# Patient Record
Sex: FEMALE | Race: OTHER | HISPANIC OR LATINO | URBAN - METROPOLITAN AREA
[De-identification: names, ages, dates, MRNs, and addresses within clinical notes are randomized per-mention and may not be internally consistent; named-entity substitution may affect disease eponyms.]

---

## 2019-02-06 VITALS
SYSTOLIC BLOOD PRESSURE: 116 MMHG | OXYGEN SATURATION: 94 % | HEIGHT: 66 IN | TEMPERATURE: 98 F | RESPIRATION RATE: 16 BRPM | HEART RATE: 80 BPM | WEIGHT: 220.02 LBS | DIASTOLIC BLOOD PRESSURE: 47 MMHG

## 2019-02-06 RX ORDER — CHLORHEXIDINE GLUCONATE 213 G/1000ML
1 SOLUTION TOPICAL ONCE
Qty: 0 | Refills: 0 | Status: DISCONTINUED | OUTPATIENT
Start: 2019-02-07 | End: 2019-02-22

## 2019-02-06 NOTE — H&P ADULT - HISTORY OF PRESENT ILLNESS
***PT TO BRING IN ALL MEDS    83 y.o Persian Speaking  Female with PMHx of HTN, Hyperlipidemia, NIDDM, who presented to her cardiologist Dr. Medina c/o recent decline in her ET over the last few months.  Pt states she can barely walk a block before easily getting fatigued whereas previously she was able to walk several blocks beofre finding the need to stop and rest.  She denies any CP,  SOB, palpitations, dizziness, syncope, recent PND/orthopnea, or  LE edema.  Nuclear Stress test performed on 05/31/18, which revealed mild inferoapical ischemia, LVEF of 53%.  Echo 05/28/18 revealed concentric LVH, LAE, grade 1 diastolic dysfunction, mild MR/MI, trace MR/AI, LVEF of 55-60%.      In light of pt's risk factors, above CCS Anginal Class 3 Equivalent symptoms, and an abnormal Stress test, pt is now referred to [facility] for recommended Cardiac Cath with possible intervention if clinically indicated to  r/o suspected underlying CAD. 83 y.o French Speaking Female with PMHx of HTN, Hyperlipidemia, NIDDM, who presented to her cardiologist Dr. Medina c/o recent decline in her ET over the last few months. Pt states she can barely walk a block before easily getting fatigued whereas previously she was able to walk several blocks beofre finding the need to stop and rest.  She denies any CP,  SOB, palpitations, dizziness, syncope, recent PND/orthopnea, or  LE edema.  Nuclear Stress test performed on 05/31/18, which revealed mild inferoapical ischemia, LVEF of 53%.  Echo 05/28/18 revealed concentric LVH, LAE, grade 1 diastolic dysfunction, mild MR/NV, trace MR/AI, LVEF of 55-60%.      In light of pt's risk factors, above CCS Anginal Class 3 Equivalent symptoms, and an abnormal Stress test, pt is now referred to [facility] for recommended Cardiac Cath with possible intervention if clinically indicated to  r/o suspected underlying CAD. 83 y.o Yi Speaking Female with PMHx of HTN, Hyperlipidemia, NIDDM, who presented to her cardiologist Dr. Medina c/o recent decline in her ET over the last few months. Pt states she can barely walk a block before easily getting fatigued whereas previously she was able to walk several blocks beofre finding the need to stop and rest.  She denies any CP,  SOB, palpitations, dizziness, syncope, recent PND/orthopnea, or  LE edema.  Nuclear Stress test performed on 05/31/18, which revealed mild inferoapical ischemia, LVEF of 53%.  Echo 05/28/18 revealed concentric LVH, LAE, grade 1 diastolic dysfunction, mild MR/AK, trace MR/AI, LVEF of 55-60%.      In light of pt's risk factors, above CCS Anginal Class 3 Equivalent symptoms, and an abnormal Stress test, pt is now referred to [facility] for recommended Cardiac Cath with possible intervention if clinically indicated to r/o suspected underlying CAD. 83 y.o Turkish Speaking Female with PMHx of HTN, Hyperlipidemia, NIDDM, who presented to her cardiologist Dr. Medina c/o recent decline in her ET over the last few months. Pt states she can barely walk a block before easily getting fatigued whereas previously she was able to walk several blocks beofre finding the need to stop and rest.  She denies any CP,  SOB, palpitations, dizziness, syncope, recent PND/orthopnea, or  LE edema. Nuclear Stress test performed on 05/31/18, which revealed mild inferoapical ischemia, LVEF of 53%. Echo 05/28/18 revealed concentric LVH, LAE, grade 1 diastolic dysfunction, mild MR/MN, trace MR/AI, LVEF of 55-60%.      In light of pt's risk factors, above CCS Anginal Class 3 Equivalent symptoms, and an abnormal Stress test, pt is now referred to [facility] for recommended Cardiac Cath with possible intervention if clinically indicated to r/o suspected underlying CAD. 83 y.o Khmer Speaking Female with PMHx of HTN, Hyperlipidemia, NIDDM, who presented to her cardiologist Dr. Medina c/o recent decline in her ET, VEGA, palpitations x 2-3 months. Pt states she can barely walk a block before easily getting fatigued whereas previously she was able to walk several blocks before finding the need to stop and rest. She denies any CP, dizziness, syncope, recent PND/orthopnea, or LE edema. Nuclear Stress test performed on 05/31/18, which revealed mild inferoapical ischemia, LVEF of 53%. Echo 05/28/18 revealed concentric LVH, LAE, grade 1 diastolic dysfunction, mild MR/TN, trace MR/AI, LVEF of 55-60%.      In light of pt's risk factors, above CCS Anginal Class 3 Equivalent symptoms, and an abnormal Stress test, pt is now referred to [facility] for recommended Cardiac Cath with possible intervention if clinically indicated to r/o suspected underlying CAD. 83 y.o Kyrgyz Speaking Female with PMHx of HTN, Hypothyroidism, Hyperlipidemia, NIDDM, who presented to her cardiologist Dr. Medina c/o recent decline in her ET, VEGA, palpitations x 2-3 months. Pt states she can barely walk a block before easily getting fatigued whereas previously she was able to walk several blocks before finding the need to stop and rest. She denies any CP, dizziness, syncope, recent PND/orthopnea, or LE edema. Nuclear Stress test performed on 05/31/18, which revealed mild inferoapical ischemia, LVEF of 53%. Echo 05/28/18 revealed concentric LVH, LAE, grade 1 diastolic dysfunction, mild MR/MD, trace MR/AI, LVEF of 55-60%.      In light of pt's risk factors, above CCS Anginal Class 3 Equivalent symptoms, and an abnormal Stress test, pt is now referred to [facility] for recommended Cardiac Cath with possible intervention if clinically indicated to r/o suspected underlying CAD.

## 2019-02-06 NOTE — H&P ADULT - PMH
Diabetes mellitus    Hyperlipidemia    Hypertension Asthma    Diabetes mellitus    Hyperlipidemia    Hypertension    Hypothyroidism

## 2019-02-06 NOTE — H&P ADULT - ASSESSMENT
82 y/o Tamaz[place] Speaking Female with pertinent PMHx of HTN, Hyperlipidemia, NIDDM presented to [facility] today for diagnostic cardiac cath with possible intervention In light of pt's risk factors, above CCS Anginal Class 3 Equivalent symptoms, and an abnormal Stress test, pt is now referred to [facility] for recommended Cardiac Cath with possible intervention if clinically indicated to r/o suspected underlying CAD.       - pre cath consented   - NPO/ISS  - pre cath IVF hydration @ 75cc/hr  - Pt reported compliance with daily Aspirin. Pt given Aspirin 325mg and Plavix 600mg loading dose given pre-cath  - Risks & benefits of procedure and alternative therapy have been explained to the patient including but not limited to: allergic reaction, bleeding w/possible need for blood transfusion, infection, renal and vascular compromise, limb damage, arrhythmia, stroke, vessel dissection/perforation, Myocardial infarction, emergent CABG. Informed consent obtained and in chart. 84 y/o Japanese Speaking Female with pertinent PMHx of HTN, Hyperlipidemia, NIDDM presented to [facility] today for diagnostic cardiac cath with possible intervention In light of pt's risk factors, above CCS Anginal Class 3 symptoms, and an abnormal Stress test, pt is now referred to [facility] for recommended Cardiac Cath with possible intervention if clinically indicated to r/o suspected underlying CAD.       - pre cath consented   - NPO/ISS  - pre cath IVF hydration @ 75cc/hr  - Pt reported compliance with daily Aspirin. Pt given Aspirin 325mg and Plavix 600mg loading dose given pre-cath  - Risks & benefits of procedure and alternative therapy have been explained to the patient including but not limited to: allergic reaction, bleeding w/possible need for blood transfusion, infection, renal and vascular compromise, limb damage, arrhythmia, stroke, vessel dissection/perforation, Myocardial infarction, emergent CABG. Informed consent obtained and in chart. 84 y/o Greenlandic Speaking Female with pertinent PMHx of HTN, Hyperlipidemia, NIDDM presented to [facility] today for diagnostic cardiac cath with possible intervention In light of pt's risk factors, above CCS Anginal Class 3 symptoms, and an abnormal Stress test, pt is now referred to [facility] for recommended Cardiac Cath with possible intervention if clinically indicated to r/o suspected underlying CAD.       - pre cath consented   - pre cath IVF hydration NS @ 75cc/hr  - Pt reported compliance with daily Aspirin. Pt given Aspirin 325mg and Plavix 600mg loading dose given pre-cath  - Risks & benefits of procedure and alternative therapy have been explained to the patient including but not limited to: allergic reaction, bleeding w/possible need for blood transfusion, infection, renal and vascular compromise, limb damage, arrhythmia, stroke, vessel dissection/perforation, Myocardial infarction, emergent CABG. Informed consent obtained and in chart.

## 2019-02-07 ENCOUNTER — OUTPATIENT (OUTPATIENT)
Dept: OUTPATIENT SERVICES | Facility: HOSPITAL | Age: 84
LOS: 1 days | Discharge: NURSING, REHAB OR LONG-TERM CARE | End: 2019-02-07
Payer: COMMERCIAL

## 2019-02-07 DIAGNOSIS — Z96.653 PRESENCE OF ARTIFICIAL KNEE JOINT, BILATERAL: Chronic | ICD-10-CM

## 2019-02-07 LAB
ALBUMIN SERPL ELPH-MCNC: 3.9 G/DL — SIGNIFICANT CHANGE UP (ref 3.3–5)
ALP SERPL-CCNC: 67 U/L — SIGNIFICANT CHANGE UP (ref 40–120)
ALT FLD-CCNC: 10 U/L — SIGNIFICANT CHANGE UP (ref 10–45)
ANION GAP SERPL CALC-SCNC: 9 MMOL/L — SIGNIFICANT CHANGE UP (ref 5–17)
APTT BLD: 29.5 SEC — SIGNIFICANT CHANGE UP (ref 27.5–36.3)
AST SERPL-CCNC: 11 U/L — SIGNIFICANT CHANGE UP (ref 10–40)
BASOPHILS # BLD AUTO: 0.07 K/UL — SIGNIFICANT CHANGE UP (ref 0–0.2)
BASOPHILS NFR BLD AUTO: 0.9 % — SIGNIFICANT CHANGE UP (ref 0–2)
BILIRUB SERPL-MCNC: 0.4 MG/DL — SIGNIFICANT CHANGE UP (ref 0.2–1.2)
BUN SERPL-MCNC: 19 MG/DL — SIGNIFICANT CHANGE UP (ref 7–23)
CALCIUM SERPL-MCNC: 9.7 MG/DL — SIGNIFICANT CHANGE UP (ref 8.4–10.5)
CHLORIDE SERPL-SCNC: 103 MMOL/L — SIGNIFICANT CHANGE UP (ref 96–108)
CHOLEST SERPL-MCNC: 195 MG/DL — SIGNIFICANT CHANGE UP (ref 10–199)
CK MB CFR SERPL CALC: 1.2 NG/ML — SIGNIFICANT CHANGE UP (ref 0–6.7)
CK SERPL-CCNC: 46 U/L — SIGNIFICANT CHANGE UP (ref 25–170)
CO2 SERPL-SCNC: 29 MMOL/L — SIGNIFICANT CHANGE UP (ref 22–31)
CREAT SERPL-MCNC: 0.72 MG/DL — SIGNIFICANT CHANGE UP (ref 0.5–1.3)
CRP SERPL-MCNC: 0.54 MG/DL — HIGH (ref 0–0.4)
EOSINOPHIL # BLD AUTO: 0.2 K/UL — SIGNIFICANT CHANGE UP (ref 0–0.5)
EOSINOPHIL NFR BLD AUTO: 2.4 % — SIGNIFICANT CHANGE UP (ref 0–6)
GLUCOSE BLDC GLUCOMTR-MCNC: 100 MG/DL — HIGH (ref 70–99)
GLUCOSE BLDC GLUCOMTR-MCNC: 112 MG/DL — HIGH (ref 70–99)
GLUCOSE BLDC GLUCOMTR-MCNC: 89 MG/DL — SIGNIFICANT CHANGE UP (ref 70–99)
GLUCOSE SERPL-MCNC: 88 MG/DL — SIGNIFICANT CHANGE UP (ref 70–99)
HBA1C BLD-MCNC: 5.9 % — HIGH (ref 4–5.6)
HCT VFR BLD CALC: 39.3 % — SIGNIFICANT CHANGE UP (ref 34.5–45)
HDLC SERPL-MCNC: 53 MG/DL — SIGNIFICANT CHANGE UP
HGB BLD-MCNC: 12 G/DL — SIGNIFICANT CHANGE UP (ref 11.5–15.5)
IMM GRANULOCYTES NFR BLD AUTO: 0.2 % — SIGNIFICANT CHANGE UP (ref 0–1.5)
INR BLD: 1.05 — SIGNIFICANT CHANGE UP (ref 0.88–1.16)
LIPID PNL WITH DIRECT LDL SERPL: 120 MG/DL — SIGNIFICANT CHANGE UP
LYMPHOCYTES # BLD AUTO: 1.67 K/UL — SIGNIFICANT CHANGE UP (ref 1–3.3)
LYMPHOCYTES # BLD AUTO: 20.3 % — SIGNIFICANT CHANGE UP (ref 13–44)
MCHC RBC-ENTMCNC: 29.6 PG — SIGNIFICANT CHANGE UP (ref 27–34)
MCHC RBC-ENTMCNC: 30.5 GM/DL — LOW (ref 32–36)
MCV RBC AUTO: 96.8 FL — SIGNIFICANT CHANGE UP (ref 80–100)
MONOCYTES # BLD AUTO: 0.7 K/UL — SIGNIFICANT CHANGE UP (ref 0–0.9)
MONOCYTES NFR BLD AUTO: 8.5 % — SIGNIFICANT CHANGE UP (ref 2–14)
NEUTROPHILS # BLD AUTO: 5.57 K/UL — SIGNIFICANT CHANGE UP (ref 1.8–7.4)
NEUTROPHILS NFR BLD AUTO: 67.7 % — SIGNIFICANT CHANGE UP (ref 43–77)
PLATELET # BLD AUTO: 250 K/UL — SIGNIFICANT CHANGE UP (ref 150–400)
POTASSIUM SERPL-MCNC: 4.7 MMOL/L — SIGNIFICANT CHANGE UP (ref 3.5–5.3)
POTASSIUM SERPL-SCNC: 4.7 MMOL/L — SIGNIFICANT CHANGE UP (ref 3.5–5.3)
PROT SERPL-MCNC: 7.6 G/DL — SIGNIFICANT CHANGE UP (ref 6–8.3)
PROTHROM AB SERPL-ACNC: 11.9 SEC — SIGNIFICANT CHANGE UP (ref 10–12.9)
RBC # BLD: 4.06 M/UL — SIGNIFICANT CHANGE UP (ref 3.8–5.2)
RBC # FLD: 14.9 % — HIGH (ref 10.3–14.5)
SODIUM SERPL-SCNC: 141 MMOL/L — SIGNIFICANT CHANGE UP (ref 135–145)
TOTAL CHOLESTEROL/HDL RATIO MEASUREMENT: 3.7 RATIO — SIGNIFICANT CHANGE UP (ref 3.3–7.1)
TRIGL SERPL-MCNC: 111 MG/DL — SIGNIFICANT CHANGE UP (ref 10–149)
WBC # BLD: 8.23 K/UL — SIGNIFICANT CHANGE UP (ref 3.8–10.5)
WBC # FLD AUTO: 8.23 K/UL — SIGNIFICANT CHANGE UP (ref 3.8–10.5)

## 2019-02-07 PROCEDURE — 82962 GLUCOSE BLOOD TEST: CPT

## 2019-02-07 PROCEDURE — C1769: CPT

## 2019-02-07 PROCEDURE — C1887: CPT

## 2019-02-07 PROCEDURE — 82550 ASSAY OF CK (CPK): CPT

## 2019-02-07 PROCEDURE — 85025 COMPLETE CBC W/AUTO DIFF WBC: CPT

## 2019-02-07 PROCEDURE — 80053 COMPREHEN METABOLIC PANEL: CPT

## 2019-02-07 PROCEDURE — 85730 THROMBOPLASTIN TIME PARTIAL: CPT

## 2019-02-07 PROCEDURE — 93005 ELECTROCARDIOGRAM TRACING: CPT

## 2019-02-07 PROCEDURE — 86140 C-REACTIVE PROTEIN: CPT

## 2019-02-07 PROCEDURE — 93571 IV DOP VEL&/PRESS C FLO 1ST: CPT | Mod: LD

## 2019-02-07 PROCEDURE — 93571 IV DOP VEL&/PRESS C FLO 1ST: CPT | Mod: 26

## 2019-02-07 PROCEDURE — 80061 LIPID PANEL: CPT

## 2019-02-07 PROCEDURE — 83036 HEMOGLOBIN GLYCOSYLATED A1C: CPT

## 2019-02-07 PROCEDURE — C1889: CPT

## 2019-02-07 PROCEDURE — C1894: CPT

## 2019-02-07 PROCEDURE — 93458 L HRT ARTERY/VENTRICLE ANGIO: CPT | Mod: 26

## 2019-02-07 PROCEDURE — 93458 L HRT ARTERY/VENTRICLE ANGIO: CPT

## 2019-02-07 PROCEDURE — 93010 ELECTROCARDIOGRAM REPORT: CPT

## 2019-02-07 PROCEDURE — 82553 CREATINE MB FRACTION: CPT

## 2019-02-07 PROCEDURE — 85610 PROTHROMBIN TIME: CPT

## 2019-02-07 RX ORDER — ATORVASTATIN CALCIUM 80 MG/1
1 TABLET, FILM COATED ORAL
Qty: 0 | Refills: 0 | COMMUNITY

## 2019-02-07 RX ORDER — ASPIRIN/CALCIUM CARB/MAGNESIUM 324 MG
325 TABLET ORAL ONCE
Qty: 0 | Refills: 0 | Status: COMPLETED | OUTPATIENT
Start: 2019-02-07 | End: 2019-02-07

## 2019-02-07 RX ORDER — SODIUM CHLORIDE 9 MG/ML
500 INJECTION INTRAMUSCULAR; INTRAVENOUS; SUBCUTANEOUS
Qty: 0 | Refills: 0 | Status: DISCONTINUED | OUTPATIENT
Start: 2019-02-07 | End: 2019-02-22

## 2019-02-07 RX ORDER — SITAGLIPTIN AND METFORMIN HYDROCHLORIDE 500; 50 MG/1; MG/1
1 TABLET, FILM COATED ORAL
Qty: 0 | Refills: 0 | COMMUNITY

## 2019-02-07 RX ORDER — LINACLOTIDE 145 UG/1
1 CAPSULE, GELATIN COATED ORAL
Qty: 0 | Refills: 0 | COMMUNITY

## 2019-02-07 RX ORDER — ASPIRIN/CALCIUM CARB/MAGNESIUM 324 MG
1 TABLET ORAL
Qty: 0 | Refills: 0 | COMMUNITY

## 2019-02-07 RX ORDER — SODIUM CHLORIDE 9 MG/ML
500 INJECTION INTRAMUSCULAR; INTRAVENOUS; SUBCUTANEOUS
Qty: 0 | Refills: 0 | Status: DISCONTINUED | OUTPATIENT
Start: 2019-02-07 | End: 2019-02-07

## 2019-02-07 RX ORDER — VALSARTAN 80 MG/1
1 TABLET ORAL
Qty: 0 | Refills: 0 | COMMUNITY

## 2019-02-07 RX ORDER — CLOPIDOGREL BISULFATE 75 MG/1
600 TABLET, FILM COATED ORAL ONCE
Qty: 0 | Refills: 0 | Status: COMPLETED | OUTPATIENT
Start: 2019-02-07 | End: 2019-02-07

## 2019-02-07 RX ORDER — TRAMADOL HYDROCHLORIDE 50 MG/1
0 TABLET ORAL
Qty: 0 | Refills: 0 | COMMUNITY

## 2019-02-07 RX ORDER — VERAPAMIL HCL 240 MG
1 CAPSULE, EXTENDED RELEASE PELLETS 24 HR ORAL
Qty: 0 | Refills: 0 | COMMUNITY

## 2019-02-07 RX ORDER — METOPROLOL TARTRATE 50 MG
1 TABLET ORAL
Qty: 0 | Refills: 0 | COMMUNITY

## 2019-02-07 RX ORDER — LEVOTHYROXINE SODIUM 125 MCG
1 TABLET ORAL
Qty: 0 | Refills: 0 | COMMUNITY

## 2019-02-07 RX ORDER — TRIAMTERENE/HYDROCHLOROTHIAZID 75 MG-50MG
1 TABLET ORAL
Qty: 0 | Refills: 0 | COMMUNITY

## 2019-02-07 RX ORDER — ALBUTEROL 90 UG/1
2 AEROSOL, METERED ORAL
Qty: 0 | Refills: 0 | COMMUNITY

## 2019-02-07 RX ADMIN — SODIUM CHLORIDE 75 MILLILITER(S): 9 INJECTION INTRAMUSCULAR; INTRAVENOUS; SUBCUTANEOUS at 15:08

## 2019-02-07 RX ADMIN — CLOPIDOGREL BISULFATE 600 MILLIGRAM(S): 75 TABLET, FILM COATED ORAL at 15:07

## 2019-02-07 RX ADMIN — Medication 325 MILLIGRAM(S): at 15:08

## 2019-02-07 NOTE — PROGRESS NOTE ADULT - SUBJECTIVE AND OBJECTIVE BOX
Interventional Cardiology PA SDA Discharge Note    Patient without complaints. Ambulated and voided without difficulties    Afebrile, VSS    Ext:    		Right/Left             Groin:   No    hematoma, No    bruit, dressing; C/D/I  		Right/Left              Radial :    hematoma,     bleeding, dressing; C/D/I      Pulses:    intact RAD/DP/PT to baseline     A/P:    83 y.o Kinyarwanda Speaking Female with PMHx of HTN, Hypothyroidism, Hyperlipidemia, DM Type II who presented for cardiac cath due to CCS Angina Class III Symptoms in the setting of an abnormal stress test. Patient s/p cardiac cath 2/7/19 FFR pLAD 0.87, mild diffuse disease RCA and LCx, EF 65%. LVEDP 12 mm Hg. Case discussed with Dr. Medina. Given patient received Angiomax for FFR patient to remain on bedrest for 4 hrs post sheath pull after hemostasis achieved. Patient will take taxi home with her son upon discharge.     1.	Stable for discharge as per attending Dr. Oliver after bed rest, pt voids, right groin stable and 30 minutes of ambulation.  2.	Follow-up with PMD/Cardiologist Dr. Medina___________ in 1-2 weeks  3.	Discharged forms signed and copies in chart

## 2019-02-07 NOTE — PROGRESS NOTE ADULT - SUBJECTIVE AND OBJECTIVE BOX
Interventional Cardiology PA Sheath Pull Note    Pt without complaints. VSS      Pre-Sheath Removal:    [X ] Right     [ ] Left          [X ] Groin    [ ] Brachial    [ ] 5Fr      [X ] 6Fr    [ ] 7Fr     [ ] 8Fr    [X ] Arterial  [ ] Venous sheath in place    [- ] Hematoma        [ -] Bleed    Pulses:    [ X] Right     [ ] Left       DP:  [ ]  Doppler   [ ]  Faint    [ ]   1+    [X ] 2+       Hemostasis Achieved with:   20 minutes manual pressure    Vasovagal Reaction: Yes/No    Meds Given: None      Post-Sheath Removal:    [X ] Right     [ ] Left          [x ] Groin    [ ] Brachial    [ ] Hematoma        [ ] Bleed       [ ] Bruit    Pulses:    [X ] Right     [ ] Left       DP:  [ ]  Doppler   [ ]  Faint    [ ]   1+    [X ] 2+     A/P:  s/p [ ] Dx Cath      [ X] IVUS/FFR     [ ] PTA/STENT       [ ] PTCA/STENT    -Continue bedrest (pt given instructions)  -Continue to monitor

## 2019-02-12 DIAGNOSIS — Z79.82 LONG TERM (CURRENT) USE OF ASPIRIN: ICD-10-CM

## 2019-02-12 DIAGNOSIS — Z79.84 LONG TERM (CURRENT) USE OF ORAL HYPOGLYCEMIC DRUGS: ICD-10-CM

## 2019-02-12 DIAGNOSIS — E11.9 TYPE 2 DIABETES MELLITUS WITHOUT COMPLICATIONS: ICD-10-CM

## 2019-02-12 DIAGNOSIS — J45.909 UNSPECIFIED ASTHMA, UNCOMPLICATED: ICD-10-CM

## 2019-02-12 DIAGNOSIS — E66.9 OBESITY, UNSPECIFIED: ICD-10-CM

## 2019-02-12 DIAGNOSIS — E03.9 HYPOTHYROIDISM, UNSPECIFIED: ICD-10-CM

## 2019-02-12 DIAGNOSIS — I25.118 ATHEROSCLEROTIC HEART DISEASE OF NATIVE CORONARY ARTERY WITH OTHER FORMS OF ANGINA PECTORIS: ICD-10-CM

## 2019-02-12 DIAGNOSIS — I10 ESSENTIAL (PRIMARY) HYPERTENSION: ICD-10-CM

## 2019-02-12 DIAGNOSIS — E78.5 HYPERLIPIDEMIA, UNSPECIFIED: ICD-10-CM

## 2019-02-12 DIAGNOSIS — M19.90 UNSPECIFIED OSTEOARTHRITIS, UNSPECIFIED SITE: ICD-10-CM

## 2019-02-12 DIAGNOSIS — R94.39 ABNORMAL RESULT OF OTHER CARDIOVASCULAR FUNCTION STUDY: ICD-10-CM
